# Patient Record
Sex: MALE | Race: OTHER | Employment: OTHER | URBAN - METROPOLITAN AREA
[De-identification: names, ages, dates, MRNs, and addresses within clinical notes are randomized per-mention and may not be internally consistent; named-entity substitution may affect disease eponyms.]

---

## 2022-02-09 ENCOUNTER — NEW PATIENT (OUTPATIENT)
Dept: URBAN - METROPOLITAN AREA CLINIC 49 | Facility: CLINIC | Age: 62
End: 2022-02-09

## 2022-02-09 DIAGNOSIS — H25.13: ICD-10-CM

## 2022-02-09 DIAGNOSIS — H02.834: ICD-10-CM

## 2022-02-09 DIAGNOSIS — H02.831: ICD-10-CM

## 2022-02-09 PROCEDURE — 92015 DETERMINE REFRACTIVE STATE: CPT

## 2022-02-09 PROCEDURE — 92004 COMPRE OPH EXAM NEW PT 1/>: CPT

## 2022-02-09 ASSESSMENT — VISUAL ACUITY
OS_GLARE: 20/60
OD_GLARE: 20/80
OS_CC: 20/20
OS_GLARE: 20/40
OU_CC: J1+
OD_GLARE: 20/30
OD_CC: 20/20-1

## 2022-02-09 ASSESSMENT — TONOMETRY
OS_IOP_MMHG: 14
OD_IOP_MMHG: 15

## 2022-02-09 NOTE — PATIENT DISCUSSION
Patient had intestine surgery to remove blockage in October 2020, was in the hospital until November 2020. Patient had complications with surgery, ripped intestines when tube was removed. Patient got an infection and had sepsis. Patient was d/c with home health until February 2021. Patient had a pic line at home. Went from 255lbs to 194lbs from October 2020- February 2021. Patient c/o pressure on left side of neck and pressure, blurry vision, sometimes blacking out of vision in OS. Explained to the patient that this sounds like a circulation issue. Patient states he recently had a carotid US and echo with Dr Evita Crespo. Reqeusting those records. Patient is now being followed by Dr Chantal Goldsmith at [address] Patient is having blurry/black vision in OS randomly. Patient states it will happen 1-2 x a day, and sometimes it won't happen for a few days.  states he loses va from inf up for minutes then returns.  no activites worsen or better it. no other sx when this occurs.  no hx azul.